# Patient Record
Sex: FEMALE | Race: WHITE | NOT HISPANIC OR LATINO
[De-identification: names, ages, dates, MRNs, and addresses within clinical notes are randomized per-mention and may not be internally consistent; named-entity substitution may affect disease eponyms.]

---

## 2019-12-11 ENCOUNTER — RX ONLY (OUTPATIENT)
Age: 44
Setting detail: RX ONLY
End: 2019-12-11

## 2019-12-11 ENCOUNTER — APPOINTMENT (RX ONLY)
Dept: URBAN - METROPOLITAN AREA CLINIC 38 | Facility: CLINIC | Age: 44
Setting detail: DERMATOLOGY
End: 2019-12-11

## 2019-12-11 ENCOUNTER — APPOINTMENT (RX ONLY)
Dept: URBAN - METROPOLITAN AREA CLINIC 37 | Facility: CLINIC | Age: 44
Setting detail: DERMATOLOGY
End: 2019-12-11

## 2019-12-11 DIAGNOSIS — D485 NEOPLASM OF UNCERTAIN BEHAVIOR OF SKIN: ICD-10-CM

## 2019-12-11 PROBLEM — D48.5 NEOPLASM OF UNCERTAIN BEHAVIOR OF SKIN: Status: ACTIVE | Noted: 2019-12-11

## 2019-12-11 PROBLEM — E05.90 THYROTOXICOSIS, UNSPECIFIED WITHOUT THYROTOXIC CRISIS OR STORM: Status: ACTIVE | Noted: 2019-12-11

## 2019-12-11 PROBLEM — F41.9 ANXIETY DISORDER, UNSPECIFIED: Status: ACTIVE | Noted: 2019-12-11

## 2019-12-11 PROBLEM — M12.9 ARTHROPATHY, UNSPECIFIED: Status: ACTIVE | Noted: 2019-12-11

## 2019-12-11 PROCEDURE — 11102 TANGNTL BX SKIN SINGLE LES: CPT

## 2019-12-11 PROCEDURE — ? SEPARATE AND IDENTIFIABLE DOCUMENTATION

## 2019-12-11 PROCEDURE — 99202 OFFICE O/P NEW SF 15 MIN: CPT | Mod: 25

## 2019-12-11 PROCEDURE — ? BIOPSY BY SHAVE METHOD

## 2019-12-11 PROCEDURE — ? COUNSELING

## 2019-12-11 ASSESSMENT — LOCATION ZONE DERM
LOCATION ZONE: ARM
LOCATION ZONE: ARM

## 2019-12-11 ASSESSMENT — LOCATION SIMPLE DESCRIPTION DERM
LOCATION SIMPLE: LEFT SHOULDER
LOCATION SIMPLE: LEFT SHOULDER

## 2019-12-11 ASSESSMENT — LOCATION DETAILED DESCRIPTION DERM
LOCATION DETAILED: LEFT POSTERIOR SHOULDER
LOCATION DETAILED: LEFT POSTERIOR SHOULDER

## 2019-12-11 NOTE — PROCEDURE: MIPS QUALITY
Detail Level: Zone
Additional Notes: Patient declines flu vaccine,no reason given
Quality 431: Preventive Care And Screening: Unhealthy Alcohol Use - Screening: Patient screened for unhealthy alcohol use using a single question and scores less than 2 times per year
Quality 130: Documentation Of Current Medications In The Medical Record: Current Medications Documented
Quality 110: Preventive Care And Screening: Influenza Immunization: Influenza Immunization not Administered for Documented Reasons.
Quality 226: Preventive Care And Screening: Tobacco Use: Screening And Cessation Intervention: Patient screened for tobacco use and is an ex/non-smoker

## 2019-12-11 NOTE — PROCEDURE: MIPS QUALITY
Quality 431: Preventive Care And Screening: Unhealthy Alcohol Use - Screening: Patient screened for unhealthy alcohol use using a single question and scores less than 2 times per year
Additional Notes: Patient declines flu vaccine,no reason given
Quality 110: Preventive Care And Screening: Influenza Immunization: Influenza Immunization not Administered for Documented Reasons.
Detail Level: Zone
Quality 226: Preventive Care And Screening: Tobacco Use: Screening And Cessation Intervention: Patient screened for tobacco use and is an ex/non-smoker
Quality 130: Documentation Of Current Medications In The Medical Record: Current Medications Documented

## 2019-12-11 NOTE — PROCEDURE: BIOPSY BY SHAVE METHOD
Depth Of Biopsy: dermis
Hide Additional Anticipated Plan?: No
Notification Instructions: Patient will be notified of biopsy results either via the portal, phone call, or letter. However, patient instructed to call the office if not contacted within 2 weeks.
Electrodesiccation Text: The wound bed was treated with electrodesiccation after the biopsy was performed.
Lab Facility: [id number]
Additional Anesthesia Volume In Cc (Will Not Render If 0): 0
Wound Care: Petrolatum
Anesthesia Type: 1% lidocaine with epinephrine
Electrodesiccation And Curettage Text: The wound bed was treated with electrodesiccation and curettage after the biopsy was performed.
Anesthesia Volume In Cc: 0.5
Biopsy Type: H and E
Type Of Destruction Used: Curettage
Consent: Verbal consent was obtained and risks were reviewed including but not limited to scarring, infection, bleeding, scabbing, incomplete removal, nerve damage and allergy to anesthesia.
Hemostasis: Drysol
Was A Bandage Applied: Yes
Dressing: bandage
Silver Nitrate Text: The wound bed was treated with silver nitrate after the biopsy was performed.
Cryotherapy Text: The wound bed was treated with cryotherapy after the biopsy was performed.
Post-Care Instructions: I reviewed with the patient in detail post-care instructions. Patient is to keep the biopsy site dry overnight, and then apply petroleum jelly twice daily until healed.
Detail Level: Detailed
Curettage Text: The wound bed was treated with curettage after the biopsy was performed.
Billing Type: Third-Party Bill
Lab: [id number]
Biopsy Method: Dermablade

## 2019-12-11 NOTE — PROCEDURE: BIOPSY BY SHAVE METHOD
Hide Topical Anesthesia?: No
Render Post-Care Instructions In Note?: yes
Hemostasis: Drysol
Biopsy Method: Dermablade
Depth Of Biopsy: dermis
Type Of Destruction Used: Curettage
Anesthesia Type: 1% lidocaine with epinephrine
Detail Level: Detailed
Consent: Verbal consent was obtained and risks were reviewed including but not limited to scarring, infection, bleeding, scabbing, incomplete removal, nerve damage and allergy to anesthesia.
Biopsy Type: H and E
Size Of Lesion In Cm: 0
Electrodesiccation And Curettage Text: The wound bed was treated with electrodesiccation and curettage after the biopsy was performed.
Wound Care: Petrolatum
Anesthesia Volume In Cc: 0.5
Cryotherapy Text: The wound bed was treated with cryotherapy after the biopsy was performed.
Dressing: bandage
Silver Nitrate Text: The wound bed was treated with silver nitrate after the biopsy was performed.
Post-Care Instructions: I reviewed with the patient in detail post-care instructions. Patient is to keep the biopsy site dry overnight, and then apply petroleum jelly twice daily until healed.
Notification Instructions: Patient will be notified of biopsy results either via the portal, phone call, or letter. However, patient instructed to call the office if not contacted within 2 weeks.
Curettage Text: The wound bed was treated with curettage after the biopsy was performed.
Electrodesiccation Text: The wound bed was treated with electrodesiccation after the biopsy was performed.
Billing Type: Third-Party Bill

## 2020-12-22 ENCOUNTER — OFFICE VISIT (OUTPATIENT)
Dept: URBAN - METROPOLITAN AREA CLINIC 94 | Facility: CLINIC | Age: 45
End: 2020-12-22
Payer: COMMERCIAL

## 2020-12-22 DIAGNOSIS — K52.9 CHRONIC DIARRHEA: ICD-10-CM

## 2020-12-22 PROCEDURE — 99203 OFFICE O/P NEW LOW 30 MIN: CPT | Performed by: INTERNAL MEDICINE

## 2020-12-22 PROCEDURE — G8427 DOCREV CUR MEDS BY ELIG CLIN: HCPCS | Performed by: INTERNAL MEDICINE

## 2020-12-22 PROCEDURE — G9903 PT SCRN TBCO ID AS NON USER: HCPCS | Performed by: INTERNAL MEDICINE

## 2020-12-22 PROCEDURE — G8417 CALC BMI ABV UP PARAM F/U: HCPCS | Performed by: INTERNAL MEDICINE

## 2020-12-22 RX ORDER — CHOLESTYRAMINE 4 G/9G
1 SCOOP POWDER, FOR SUSPENSION ORAL TWICE A DAY
Qty: 1 | Refills: 1 | OUTPATIENT

## 2020-12-22 RX ORDER — MELOXICAM 15 MG/1
TAKE 1 TABLET (15 MG) BY ORAL ROUTE ONCE DAILY TABLET ORAL 1
Qty: 0 | Refills: 0 | Status: DISCONTINUED | COMMUNITY
Start: 1900-01-01

## 2020-12-22 RX ORDER — ASPIRIN/ACETAMINOPHEN/CAFFEINE 250-250-65
TABLET ORAL
Qty: 0 | Refills: 0 | Status: DISCONTINUED | COMMUNITY
Start: 1900-01-01

## 2020-12-22 RX ORDER — HYDROXYCHLOROQUINE SULFATE 200 MG/1
TAKE 2 TABLETS (400 MG) BY ORAL ROUTE ONCE DAILY TABLET ORAL 1
Qty: 0 | Refills: 0 | Status: ACTIVE | COMMUNITY
Start: 1900-01-01

## 2020-12-22 RX ORDER — DULOXETINE 30 MG/1
1 CAPSULE 30 MG IN AM, AND 60 MG IN PM CAPSULE, DELAYED RELEASE PELLETS ORAL
Refills: 0 | Status: ACTIVE | COMMUNITY
Start: 1900-01-01

## 2020-12-22 RX ORDER — GABAPENTIN 100 MG/1
1 CAPSULE CAPSULE ORAL QHS
Status: ACTIVE | COMMUNITY

## 2020-12-22 RX ORDER — HYDROCHLOROTHIAZIDE 25 MG/1
TAKE 1 TABLET (25 MG) BY ORAL ROUTE ONCE DAILY TABLET ORAL 1
Qty: 0 | Refills: 0 | Status: DISCONTINUED | COMMUNITY
Start: 1900-01-01

## 2020-12-22 RX ORDER — LEVOTHYROXINE SODIUM 0.15 MG/1
1 TABLET IN THE MORNING ON AN EMPTY STOMACH TABLET ORAL ONCE A DAY
Refills: 0 | Status: ACTIVE | COMMUNITY
Start: 1900-01-01

## 2020-12-22 RX ORDER — NABUMETONE 500 MG/1
1 TABLET TABLET, FILM COATED ORAL TWICE A DAY
Qty: 60 TABLET | Status: ACTIVE | COMMUNITY

## 2020-12-22 RX ORDER — LISINOPRIL AND HYDROCHLOROTHIAZIDE TABLETS 10; 12.5 MG/1; MG/1
TAKE 1 TABLET BY ORAL ROUTE ONCE DAILY TABLET ORAL 1
Qty: 0 | Refills: 0 | Status: ACTIVE | COMMUNITY
Start: 1900-01-01

## 2020-12-22 NOTE — HPI-OTHER HISTORIES
Pt presents for evaluation of chronic diarrhea. Pt states that she had diarrhea all her life. She has average of 5-6 loose liquid bowel movements daily. Diarrhea is mostly postprandial. Pt has abdominal bloating and cramping with diarrhea. Ocassional rectal bleeding, which she attributes to hemorrhoids. States that had a colonoscopy > 20 yrs ago which was reportedly normal.  Pt states that she recently had a few episodes of syncope with diarrhea. Seh develops irregular heartrate, palpitations, dizziness, sweating and then passes out. Labs from 8/2020 showed normal CBC and CMP. Pt states that she had iron deficiency anemia with past which resolved with iron supplements.

## 2021-01-12 ENCOUNTER — TELEPHONE ENCOUNTER (OUTPATIENT)
Dept: URBAN - METROPOLITAN AREA CLINIC 94 | Facility: CLINIC | Age: 46
End: 2021-01-12

## 2021-01-12 RX ORDER — CHOLESTYRAMINE 4 G/9G
1 SCOOP POWDER, FOR SUSPENSION ORAL TWICE A DAY
Qty: 1 | Refills: 3

## 2022-01-26 ENCOUNTER — CLAIMS CREATED FROM THE CLAIM WINDOW (OUTPATIENT)
Dept: URBAN - METROPOLITAN AREA CLINIC 94 | Facility: CLINIC | Age: 47
End: 2022-01-26
Payer: COMMERCIAL

## 2022-01-26 ENCOUNTER — WEB ENCOUNTER (OUTPATIENT)
Dept: URBAN - METROPOLITAN AREA CLINIC 94 | Facility: CLINIC | Age: 47
End: 2022-01-26

## 2022-01-26 VITALS
DIASTOLIC BLOOD PRESSURE: 92 MMHG | HEART RATE: 76 BPM | SYSTOLIC BLOOD PRESSURE: 158 MMHG | TEMPERATURE: 97.5 F | WEIGHT: 270 LBS | BODY MASS INDEX: 38.65 KG/M2 | HEIGHT: 70 IN

## 2022-01-26 DIAGNOSIS — K21.9 GASTROESOPHAGEAL REFLUX DISEASE, UNSPECIFIED WHETHER ESOPHAGITIS PRESENT: ICD-10-CM

## 2022-01-26 DIAGNOSIS — K52.9 CHRONIC DIARRHEA: ICD-10-CM

## 2022-01-26 DIAGNOSIS — K62.5 RECTAL BLEEDING: ICD-10-CM

## 2022-01-26 DIAGNOSIS — R13.10 DYSPHAGIA, UNSPECIFIED TYPE: ICD-10-CM

## 2022-01-26 PROCEDURE — 99214 OFFICE O/P EST MOD 30 MIN: CPT | Performed by: PHYSICIAN ASSISTANT

## 2022-01-26 PROCEDURE — 99214 OFFICE O/P EST MOD 30 MIN: CPT | Performed by: INTERNAL MEDICINE

## 2022-01-26 RX ORDER — CHOLESTYRAMINE 4 G/9G
1 SCOOP POWDER, FOR SUSPENSION ORAL TWICE A DAY
Qty: 1 | Refills: 3 | Status: ON HOLD | COMMUNITY

## 2022-01-26 RX ORDER — GABAPENTIN 100 MG/1
1 CAPSULE CAPSULE ORAL QHS
Status: ON HOLD | COMMUNITY

## 2022-01-26 RX ORDER — SULFASALAZINE 500 MG/1
1 TABLET TABLET ORAL ONCE A DAY
Status: ACTIVE | COMMUNITY

## 2022-01-26 RX ORDER — LISINOPRIL AND HYDROCHLOROTHIAZIDE TABLETS 10; 12.5 MG/1; MG/1
TAKE 1 TABLET BY ORAL ROUTE ONCE DAILY TABLET ORAL 1
Qty: 0 | Refills: 0 | Status: ACTIVE | COMMUNITY
Start: 1900-01-01

## 2022-01-26 RX ORDER — LEVOTHYROXINE SODIUM 0.15 MG/1
1 TABLET IN THE MORNING ON AN EMPTY STOMACH TABLET ORAL ONCE A DAY
Refills: 0 | Status: ACTIVE | COMMUNITY
Start: 1900-01-01

## 2022-01-26 RX ORDER — NABUMETONE 500 MG/1
1 TABLET TABLET, FILM COATED ORAL TWICE A DAY
Qty: 60 TABLET | Status: ON HOLD | COMMUNITY

## 2022-01-26 RX ORDER — COLCHICINE 0.6 MG/1
1 TABLET TABLET, FILM COATED ORAL
Status: ACTIVE | COMMUNITY

## 2022-01-26 RX ORDER — DULOXETINE 30 MG/1
1 CAPSULE 30 MG IN AM, AND 60 MG IN PM CAPSULE, DELAYED RELEASE PELLETS ORAL
Refills: 0 | Status: ACTIVE | COMMUNITY
Start: 1900-01-01

## 2022-01-26 RX ORDER — HYDROXYCHLOROQUINE SULFATE 200 MG/1
TAKE 2 TABLETS (400 MG) BY ORAL ROUTE ONCE DAILY TABLET ORAL 1
Qty: 0 | Refills: 0 | Status: ON HOLD | COMMUNITY
Start: 1900-01-01

## 2022-01-26 NOTE — HPI-OTHER HISTORIES
45 yo F returns today for f/u visit for hx of chronic diarrhea and GERD. Pt last seen in 2020.   Pt presents for evaluation of chronic diarrhea. Pt states that she had diarrhea all her life. She has average of 5-6 loose liquid bowel movements daily. Diarrhea is mostly postprandial. Pt has abdominal bloating and cramping with diarrhea. Ocassional rectal bleeding, which she attributes to hemorrhoids. States that had a colonoscopy > 20 + yrs ago which was reportedly normal.  Since her last visit she now reports post prandial urgency and nocturnal defecation. She denies weight loss. She does report BR blood noted on TP and dripping into commode.   Pt also reports chronic GERD with issues swallowing. She notices with eating certain foods as if the food hits an area in her throat and then she feels like her throat closes up. She denies food getting stuck. Pt also reports more frequent episodes of feeling nauseated.   Pt did complete a cardiac work-up last yr which was negative, due to hx of dizziness.   PMH : NAOMI

## 2022-02-14 ENCOUNTER — OFFICE VISIT (OUTPATIENT)
Dept: URBAN - METROPOLITAN AREA SURGERY CENTER 17 | Facility: SURGERY CENTER | Age: 47
End: 2022-02-14

## 2022-03-07 ENCOUNTER — OFFICE VISIT (OUTPATIENT)
Dept: URBAN - METROPOLITAN AREA CLINIC 94 | Facility: CLINIC | Age: 47
End: 2022-03-07

## 2022-03-07 RX ORDER — GABAPENTIN 100 MG/1
1 CAPSULE CAPSULE ORAL QHS
Status: ON HOLD | COMMUNITY

## 2022-03-07 RX ORDER — CHOLESTYRAMINE 4 G/9G
1 SCOOP POWDER, FOR SUSPENSION ORAL TWICE A DAY
Qty: 1 | Refills: 3 | Status: ON HOLD | COMMUNITY

## 2022-03-07 RX ORDER — COLCHICINE 0.6 MG/1
1 TABLET TABLET, FILM COATED ORAL
Status: ACTIVE | COMMUNITY

## 2022-03-07 RX ORDER — DULOXETINE 30 MG/1
1 CAPSULE 30 MG IN AM, AND 60 MG IN PM CAPSULE, DELAYED RELEASE PELLETS ORAL
Refills: 0 | Status: ACTIVE | COMMUNITY
Start: 1900-01-01

## 2022-03-07 RX ORDER — NABUMETONE 500 MG/1
1 TABLET TABLET, FILM COATED ORAL TWICE A DAY
Qty: 60 TABLET | Status: ON HOLD | COMMUNITY

## 2022-03-07 RX ORDER — LEVOTHYROXINE SODIUM 0.15 MG/1
1 TABLET IN THE MORNING ON AN EMPTY STOMACH TABLET ORAL ONCE A DAY
Refills: 0 | Status: ACTIVE | COMMUNITY
Start: 1900-01-01

## 2022-03-07 RX ORDER — HYDROXYCHLOROQUINE SULFATE 200 MG/1
TAKE 2 TABLETS (400 MG) BY ORAL ROUTE ONCE DAILY TABLET ORAL 1
Qty: 0 | Refills: 0 | Status: ON HOLD | COMMUNITY
Start: 1900-01-01

## 2022-03-07 RX ORDER — LISINOPRIL AND HYDROCHLOROTHIAZIDE TABLETS 10; 12.5 MG/1; MG/1
TAKE 1 TABLET BY ORAL ROUTE ONCE DAILY TABLET ORAL 1
Qty: 0 | Refills: 0 | Status: ACTIVE | COMMUNITY
Start: 1900-01-01

## 2022-03-07 RX ORDER — SULFASALAZINE 500 MG/1
1 TABLET TABLET ORAL ONCE A DAY
Status: ACTIVE | COMMUNITY

## 2022-03-18 ENCOUNTER — OFFICE VISIT (OUTPATIENT)
Dept: URBAN - METROPOLITAN AREA SURGERY CENTER 17 | Facility: SURGERY CENTER | Age: 47
End: 2022-03-18

## 2022-04-25 PROBLEM — 40739000: Status: ACTIVE | Noted: 2022-01-26

## 2022-04-25 PROBLEM — 235595009: Status: ACTIVE | Noted: 2022-01-26

## 2022-05-02 ENCOUNTER — LAB OUTSIDE AN ENCOUNTER (OUTPATIENT)
Dept: URBAN - METROPOLITAN AREA CLINIC 94 | Facility: CLINIC | Age: 47
End: 2022-05-02

## 2022-05-05 LAB
CALPROTECTIN, STOOL - QDX: (no result)
PANCREATICELASTASE ELISA, STOOL: (no result)

## 2022-05-06 ENCOUNTER — OFFICE VISIT (OUTPATIENT)
Dept: URBAN - METROPOLITAN AREA SURGERY CENTER 17 | Facility: SURGERY CENTER | Age: 47
End: 2022-05-06
Payer: COMMERCIAL

## 2022-05-06 DIAGNOSIS — R13.19 CERVICAL DYSPHAGIA: ICD-10-CM

## 2022-05-06 DIAGNOSIS — K31.89 ACQUIRED DEFORMITY OF DUODENUM: ICD-10-CM

## 2022-05-06 DIAGNOSIS — K63.89 BACTERIAL OVERGROWTH SYNDROME: ICD-10-CM

## 2022-05-06 DIAGNOSIS — R19.7 ACUTE DIARRHEA: ICD-10-CM

## 2022-05-06 DIAGNOSIS — K22.89 DILATION OF ESOPHAGUS: ICD-10-CM

## 2022-05-06 DIAGNOSIS — K29.60 ADENOPAPILLOMATOSIS GASTRICA: ICD-10-CM

## 2022-05-06 DIAGNOSIS — K62.5 ANAL BLEEDING: ICD-10-CM

## 2022-05-06 DIAGNOSIS — K22.2 ACQUIRED ESOPHAGEAL RING: ICD-10-CM

## 2022-05-06 PROCEDURE — G8907 PT DOC NO EVENTS ON DISCHARG: HCPCS | Performed by: INTERNAL MEDICINE

## 2022-05-06 PROCEDURE — 45380 COLONOSCOPY AND BIOPSY: CPT | Performed by: INTERNAL MEDICINE

## 2022-05-06 PROCEDURE — 43249 ESOPH EGD DILATION <30 MM: CPT | Performed by: INTERNAL MEDICINE

## 2022-05-06 PROCEDURE — 43239 EGD BIOPSY SINGLE/MULTIPLE: CPT | Performed by: INTERNAL MEDICINE

## 2022-05-06 RX ORDER — HYDROXYCHLOROQUINE SULFATE 200 MG/1
TAKE 2 TABLETS (400 MG) BY ORAL ROUTE ONCE DAILY TABLET ORAL 1
Qty: 0 | Refills: 0 | Status: ON HOLD | COMMUNITY
Start: 1900-01-01

## 2022-05-06 RX ORDER — LEVOTHYROXINE SODIUM 0.15 MG/1
1 TABLET IN THE MORNING ON AN EMPTY STOMACH TABLET ORAL ONCE A DAY
Refills: 0 | Status: ACTIVE | COMMUNITY
Start: 1900-01-01

## 2022-05-06 RX ORDER — DULOXETINE 30 MG/1
1 CAPSULE 30 MG IN AM, AND 60 MG IN PM CAPSULE, DELAYED RELEASE PELLETS ORAL
Refills: 0 | Status: ACTIVE | COMMUNITY
Start: 1900-01-01

## 2022-05-06 RX ORDER — OMEPRAZOLE 40 MG/1
TAKE 1 CAPSULE (40 MG) BY ORAL ROUTE ONCE DAILY BEFORE A MEAL FOR 90 DAYS CAPSULE, DELAYED RELEASE PELLETS ORAL 1
Qty: 30 | Refills: 6 | OUTPATIENT

## 2022-05-06 RX ORDER — NABUMETONE 500 MG/1
1 TABLET TABLET, FILM COATED ORAL TWICE A DAY
Qty: 60 TABLET | Status: ON HOLD | COMMUNITY

## 2022-05-06 RX ORDER — SULFASALAZINE 500 MG/1
1 TABLET TABLET ORAL ONCE A DAY
Status: ACTIVE | COMMUNITY

## 2022-05-06 RX ORDER — GABAPENTIN 100 MG/1
1 CAPSULE CAPSULE ORAL QHS
Status: ON HOLD | COMMUNITY

## 2022-05-06 RX ORDER — CHOLESTYRAMINE 4 G/9G
1 SCOOP POWDER, FOR SUSPENSION ORAL TWICE A DAY
Qty: 1 | Refills: 3 | Status: ON HOLD | COMMUNITY

## 2022-05-06 RX ORDER — LISINOPRIL AND HYDROCHLOROTHIAZIDE TABLETS 10; 12.5 MG/1; MG/1
TAKE 1 TABLET BY ORAL ROUTE ONCE DAILY TABLET ORAL 1
Qty: 0 | Refills: 0 | Status: ACTIVE | COMMUNITY
Start: 1900-01-01

## 2022-05-06 RX ORDER — COLCHICINE 0.6 MG/1
1 TABLET TABLET, FILM COATED ORAL
Status: ACTIVE | COMMUNITY

## 2022-05-10 ENCOUNTER — APPOINTMENT (RX ONLY)
Dept: URBAN - METROPOLITAN AREA CLINIC 41 | Facility: CLINIC | Age: 47
Setting detail: DERMATOLOGY
End: 2022-05-10

## 2022-05-10 DIAGNOSIS — L23.9 ALLERGIC CONTACT DERMATITIS, UNSPECIFIED CAUSE: ICD-10-CM

## 2022-05-10 PROCEDURE — ? PRESCRIPTION

## 2022-05-10 PROCEDURE — ? ORDER TESTS

## 2022-05-10 PROCEDURE — 99213 OFFICE O/P EST LOW 20 MIN: CPT

## 2022-05-10 PROCEDURE — ? COUNSELING

## 2022-05-10 PROCEDURE — ? TREATMENT REGIMEN

## 2022-05-10 RX ORDER — MUPIROCIN 20 MG/G
1 OINTMENT TOPICAL BID
Qty: 22 | Refills: 0 | Status: ERX | COMMUNITY
Start: 2022-05-10

## 2022-05-10 RX ORDER — CLOBETASOL PROPIONATE 0.5 MG/G
1 CREAM TOPICAL BID
Qty: 60 | Refills: 0 | Status: ERX | COMMUNITY
Start: 2022-05-10

## 2022-05-10 RX ADMIN — MUPIROCIN 1: 20 OINTMENT TOPICAL at 00:00

## 2022-05-10 RX ADMIN — CLOBETASOL PROPIONATE 1: 0.5 CREAM TOPICAL at 00:00

## 2022-05-10 ASSESSMENT — LOCATION ZONE DERM: LOCATION ZONE: LEG

## 2022-05-10 ASSESSMENT — LOCATION SIMPLE DESCRIPTION DERM: LOCATION SIMPLE: LEFT POPLITEAL SKIN

## 2022-05-10 ASSESSMENT — LOCATION DETAILED DESCRIPTION DERM: LOCATION DETAILED: LEFT POPLITEAL SKIN

## 2022-05-10 NOTE — PROCEDURE: TREATMENT REGIMEN
Initiate Treatment: Clobetasol cream - apply thin layer twice daily x2 weeks to affected area then wait 3-5 minutes and apply the Mupirocin twice daily x10 days.
Plan: If no improvement in 7 days let me know through patient portal. Recommended sending photos. Bacterial culture performed to rule out secondary infection.
Detail Level: Zone

## 2022-05-10 NOTE — HPI: RASH
What Type Of Note Output Would You Prefer (Optional)?: Standard Output
Is The Patient Presenting As Previously Scheduled?: Yes
How Severe Is Your Rash?: moderate
Is This A New Presentation, Or A Follow-Up?: Rash
Additional History: Patient was recently doing yard work and she thinks that she may have came into contact with poison ivy. She has had it now 2 weeks and it is becoming very painful. She states it feels like her skin is cracking open at affected area on left popliteal fossa.

## 2022-05-10 NOTE — PROCEDURE: ORDER TESTS
Lab Facility: [id number]
Expected Date Of Service: 05/10/2022
Billing Type: Third-Party Bill
Performing Laboratory: [id number]
Bill For Surgical Tray: no

## 2022-06-01 ENCOUNTER — ERX REFILL RESPONSE (OUTPATIENT)
Dept: URBAN - METROPOLITAN AREA CLINIC 52 | Facility: CLINIC | Age: 47
End: 2022-06-01

## 2022-06-01 RX ORDER — OMEPRAZOLE 40 MG/1
TAKE 1 CAPSULE (40 MG) BY ORAL ROUTE ONCE DAILY BEFORE A MEAL FOR 90 DAYS ORAL 30 DAYS CAPSULE, DELAYED RELEASE ORAL
Qty: 30 CAPSULE | Refills: 7 | OUTPATIENT

## 2022-06-01 RX ORDER — OMEPRAZOLE 40 MG/1
TAKE 1 CAPSULE (40 MG) BY ORAL ROUTE ONCE DAILY BEFORE A MEAL FOR 90 DAYS CAPSULE, DELAYED RELEASE PELLETS ORAL 1
Qty: 30 | Refills: 6 | OUTPATIENT

## 2024-01-29 ENCOUNTER — OFFICE VISIT (OUTPATIENT)
Dept: URBAN - METROPOLITAN AREA CLINIC 94 | Facility: CLINIC | Age: 49
End: 2024-01-29
Payer: COMMERCIAL

## 2024-01-29 VITALS
SYSTOLIC BLOOD PRESSURE: 131 MMHG | HEIGHT: 70 IN | BODY MASS INDEX: 36.36 KG/M2 | TEMPERATURE: 97.3 F | DIASTOLIC BLOOD PRESSURE: 83 MMHG | HEART RATE: 89 BPM | WEIGHT: 254 LBS

## 2024-01-29 DIAGNOSIS — K21.9 CHRONIC GERD: ICD-10-CM

## 2024-01-29 DIAGNOSIS — K58.2 IRRITABLE BOWEL SYNDROME WITH BOTH CONSTIPATION AND DIARRHEA: ICD-10-CM

## 2024-01-29 PROBLEM — 235595009: Status: ACTIVE | Noted: 2024-01-29

## 2024-01-29 PROBLEM — 10743008: Status: ACTIVE | Noted: 2024-01-29

## 2024-01-29 PROCEDURE — 99214 OFFICE O/P EST MOD 30 MIN: CPT | Performed by: INTERNAL MEDICINE

## 2024-01-29 RX ORDER — LEVOTHYROXINE SODIUM 0.15 MG/1
1 TABLET IN THE MORNING ON AN EMPTY STOMACH TABLET ORAL ONCE A DAY
Refills: 0 | Status: ACTIVE | COMMUNITY
Start: 1900-01-01

## 2024-01-29 RX ORDER — CHOLESTYRAMINE 4 G/9G
1 SCOOP POWDER, FOR SUSPENSION ORAL TWICE A DAY
Qty: 1 | Refills: 3 | Status: DISCONTINUED | COMMUNITY

## 2024-01-29 RX ORDER — FOLIC ACID 1 MG/1
1 TABLET TABLET ORAL ONCE A DAY
Status: ACTIVE | COMMUNITY

## 2024-01-29 RX ORDER — SULFASALAZINE 500 MG/1
1 TABLET TABLET ORAL ONCE A DAY
Status: ACTIVE | COMMUNITY

## 2024-01-29 RX ORDER — LISINOPRIL AND HYDROCHLOROTHIAZIDE TABLETS 10; 12.5 MG/1; MG/1
TAKE 1 TABLET BY ORAL ROUTE ONCE DAILY TABLET ORAL 1
Qty: 0 | Refills: 0 | Status: ACTIVE | COMMUNITY
Start: 1900-01-01

## 2024-01-29 RX ORDER — NABUMETONE 500 MG/1
1 TABLET TABLET, FILM COATED ORAL TWICE A DAY
Qty: 60 TABLET | Status: DISCONTINUED | COMMUNITY

## 2024-01-29 RX ORDER — ATORVASTATIN CALCIUM 10 MG/1
1 TABLET TABLET, FILM COATED ORAL ONCE A DAY
Status: ACTIVE | COMMUNITY

## 2024-01-29 RX ORDER — HYDROXYCHLOROQUINE SULFATE 200 MG/1
TAKE 2 TABLETS (400 MG) BY ORAL ROUTE ONCE DAILY TABLET ORAL 1
Qty: 0 | Refills: 0 | Status: DISCONTINUED | COMMUNITY
Start: 1900-01-01

## 2024-01-29 RX ORDER — OMEPRAZOLE 40 MG/1
TAKE 1 CAPSULE (40 MG) BY ORAL ROUTE ONCE DAILY BEFORE A MEAL FOR 90 DAYS ORAL 30 DAYS CAPSULE, DELAYED RELEASE ORAL
Qty: 30 CAPSULE | Refills: 7 | Status: ACTIVE | COMMUNITY

## 2024-01-29 RX ORDER — COLCHICINE 0.6 MG/1
1 TABLET TABLET, FILM COATED ORAL
Status: DISCONTINUED | COMMUNITY

## 2024-01-29 RX ORDER — GABAPENTIN 100 MG/1
1 CAPSULE CAPSULE ORAL QHS
Status: DISCONTINUED | COMMUNITY

## 2024-01-29 RX ORDER — METFORMIN HYDROCHLORIDE 1000 MG/1
1 TABLET WITH A MEAL TABLET, FILM COATED ORAL TWICE PER DAY
Status: ACTIVE | COMMUNITY
Start: 2024-01-29

## 2024-01-29 RX ORDER — DULOXETINE 30 MG/1
1 CAPSULE 30 MG IN AM, AND 60 MG IN PM CAPSULE, DELAYED RELEASE PELLETS ORAL
Refills: 0 | Status: ACTIVE | COMMUNITY
Start: 1900-01-01

## 2024-01-29 NOTE — HPI-TODAY'S VISIT:
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 Random colon biopsies were normal Fecal elastase and calprotectin were normal in 2022. - Pt has chronic diarrhea alternating with constipation. Bloating, gas and cramping - GERD symptoms well controlled with omeprazole - Denies anorexia, weight loss, rectal bleeding or dysphagia

## 2024-04-29 ENCOUNTER — OV EP (OUTPATIENT)
Dept: URBAN - METROPOLITAN AREA CLINIC 94 | Facility: CLINIC | Age: 49
End: 2024-04-29
Payer: COMMERCIAL

## 2024-04-29 VITALS
HEIGHT: 70 IN | DIASTOLIC BLOOD PRESSURE: 76 MMHG | WEIGHT: 254 LBS | BODY MASS INDEX: 36.36 KG/M2 | TEMPERATURE: 97.2 F | SYSTOLIC BLOOD PRESSURE: 121 MMHG | OXYGEN SATURATION: 91 % | HEART RATE: 86 BPM

## 2024-04-29 DIAGNOSIS — K21.9 GASTROESOPHAGEAL REFLUX DISEASE, UNSPECIFIED WHETHER ESOPHAGITIS PRESENT: ICD-10-CM

## 2024-04-29 DIAGNOSIS — K58.2 IRRITABLE BOWEL SYNDROME WITH BOTH CONSTIPATION AND DIARRHEA: ICD-10-CM

## 2024-04-29 PROCEDURE — 99213 OFFICE O/P EST LOW 20 MIN: CPT | Performed by: INTERNAL MEDICINE

## 2024-04-29 RX ORDER — METFORMIN HYDROCHLORIDE 1000 MG/1
1 TABLET WITH A MEAL TABLET, FILM COATED ORAL TWICE PER DAY
Status: ACTIVE | COMMUNITY
Start: 2024-01-29

## 2024-04-29 RX ORDER — FOLIC ACID 1 MG/1
1 TABLET TABLET ORAL ONCE A DAY
Status: ACTIVE | COMMUNITY

## 2024-04-29 RX ORDER — LISINOPRIL AND HYDROCHLOROTHIAZIDE TABLETS 10; 12.5 MG/1; MG/1
TAKE 1 TABLET BY ORAL ROUTE ONCE DAILY TABLET ORAL 1
Qty: 0 | Refills: 0 | Status: ACTIVE | COMMUNITY
Start: 1900-01-01

## 2024-04-29 RX ORDER — ATORVASTATIN CALCIUM 10 MG/1
1 TABLET TABLET, FILM COATED ORAL ONCE A DAY
Status: ACTIVE | COMMUNITY

## 2024-04-29 RX ORDER — LEVOTHYROXINE SODIUM 0.15 MG/1
1 TABLET IN THE MORNING ON AN EMPTY STOMACH TABLET ORAL ONCE A DAY
Refills: 0 | Status: ACTIVE | COMMUNITY
Start: 1900-01-01

## 2024-04-29 RX ORDER — OMEPRAZOLE 40 MG/1
TAKE 1 CAPSULE (40 MG) BY ORAL ROUTE ONCE DAILY BEFORE A MEAL FOR 90 DAYS ORAL 30 DAYS CAPSULE, DELAYED RELEASE ORAL
Qty: 30 CAPSULE | Refills: 7 | Status: ACTIVE | COMMUNITY

## 2024-04-29 RX ORDER — SULFASALAZINE 500 MG/1
1 TABLET TABLET ORAL ONCE A DAY
Status: ACTIVE | COMMUNITY

## 2024-04-29 RX ORDER — DULOXETINE 30 MG/1
1 CAPSULE 30 MG IN AM, AND 60 MG IN PM CAPSULE, DELAYED RELEASE PELLETS ORAL
Refills: 0 | Status: ACTIVE | COMMUNITY
Start: 1900-01-01

## 2024-04-29 NOTE — HPI-TODAY'S VISIT:
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 Random colon biopsies were normal Fecal elastase and calprotectin were normal in 2022. - Pt has chronic diarrhea alternating with constipation. Bloating, gas and cramping - GERD symptoms well controlled with omeprazole - Denies anorexia, weight loss, rectal bleeding or dysphagia - In 1/2024 pt was started on probiotics and low FODMAP diet. Anticholinergics were discussed. - Currently, her IBS symptoms are well controlled with diiet and probiotics